# Patient Record
Sex: MALE | URBAN - METROPOLITAN AREA
[De-identification: names, ages, dates, MRNs, and addresses within clinical notes are randomized per-mention and may not be internally consistent; named-entity substitution may affect disease eponyms.]

---

## 2020-08-17 ENCOUNTER — TELEPHONE (OUTPATIENT)
Dept: UROLOGY | Facility: CLINIC | Age: 70
End: 2020-08-17

## 2020-08-17 NOTE — TELEPHONE ENCOUNTER
FAXED (with confirmation) prior office notes to [facility], [facility]. In [place], [place].

## 2024-11-25 DIAGNOSIS — C61 MALIGNANT NEOPLASM OF PROSTATE: Primary | ICD-10-CM

## 2024-12-16 ENCOUNTER — HOSPITAL ENCOUNTER (OUTPATIENT)
Dept: RADIOLOGY | Facility: HOSPITAL | Age: 74
Discharge: HOME | End: 2024-12-16
Attending: NURSE PRACTITIONER
Payer: OTHER GOVERNMENT

## 2024-12-16 DIAGNOSIS — C61 MALIGNANT NEOPLASM OF PROSTATE: ICD-10-CM

## 2024-12-16 PROCEDURE — 78815 PET IMAGE W/CT SKULL-THIGH: CPT | Mod: TC

## 2024-12-16 PROCEDURE — A9596 HC GALLIUM GA-68 GOZETOTIDE, DX (ILLUCCIX), PER 1 MCI: HCPCS | Mod: TB | Performed by: NURSE PRACTITIONER

## 2024-12-16 RX ADMIN — KIT FOR THE PREPARATION OF GALLIUM GA 68 GOZETOTIDE INJECTION 5.3 MILLICURIE: KIT INTRAVENOUS at 11:12
